# Patient Record
Sex: FEMALE | NOT HISPANIC OR LATINO
[De-identification: names, ages, dates, MRNs, and addresses within clinical notes are randomized per-mention and may not be internally consistent; named-entity substitution may affect disease eponyms.]

---

## 2020-07-02 ENCOUNTER — TRANSCRIPTION ENCOUNTER (OUTPATIENT)
Age: 70
End: 2020-07-02

## 2022-08-09 ENCOUNTER — APPOINTMENT (OUTPATIENT)
Dept: ORTHOPEDIC SURGERY | Facility: CLINIC | Age: 72
End: 2022-08-09

## 2022-08-09 VITALS — HEIGHT: 65 IN | WEIGHT: 180 LBS | BODY MASS INDEX: 29.99 KG/M2

## 2022-08-09 DIAGNOSIS — E78.00 PURE HYPERCHOLESTEROLEMIA, UNSPECIFIED: ICD-10-CM

## 2022-08-09 DIAGNOSIS — M65.9 SYNOVITIS AND TENOSYNOVITIS, UNSPECIFIED: ICD-10-CM

## 2022-08-09 DIAGNOSIS — M17.11 UNILATERAL PRIMARY OSTEOARTHRITIS, RIGHT KNEE: ICD-10-CM

## 2022-08-09 PROCEDURE — 99213 OFFICE O/P EST LOW 20 MIN: CPT

## 2022-08-09 NOTE — HISTORY OF PRESENT ILLNESS
[de-identified] : pt is here for a fu visit of the right knee. the pain in the right has been okay.\par the pain in the right  knee gets worse with activity.  pt feels discomfort in the right knee.   pt had a steroid injection in the right knee on 11/2/21 which helped a lot.

## 2022-08-09 NOTE — DISCUSSION/SUMMARY
[Medication Risks Reviewed] : Medication risks reviewed [Surgical risks reviewed] : Surgical risks reviewed [de-identified] : Since the patient is doing very well with the previous injection she is good with advil and ice for now. if the pain gets worse then she should follow up for another injection. \par \par

## 2023-02-14 ENCOUNTER — APPOINTMENT (OUTPATIENT)
Dept: ORTHOPEDIC SURGERY | Facility: CLINIC | Age: 73
End: 2023-02-14

## 2023-04-20 ENCOUNTER — APPOINTMENT (OUTPATIENT)
Dept: ORTHOPEDIC SURGERY | Facility: CLINIC | Age: 73
End: 2023-04-20
Payer: MEDICARE

## 2023-04-20 VITALS — WEIGHT: 180 LBS | BODY MASS INDEX: 29.99 KG/M2 | HEIGHT: 65 IN

## 2023-04-20 DIAGNOSIS — M79.673 PAIN IN UNSPECIFIED FOOT: ICD-10-CM

## 2023-04-20 DIAGNOSIS — Z78.9 OTHER SPECIFIED HEALTH STATUS: ICD-10-CM

## 2023-04-20 PROCEDURE — 99213 OFFICE O/P EST LOW 20 MIN: CPT

## 2023-04-21 NOTE — ASSESSMENT
[FreeTextEntry1] : Patient with occasional episodes of lateral foot pain, especially with walking barefoot. She has a normal lspine exam today and she cannot elicit the pain today that she gets. I believe the pain is likely generated from her foot and not her back. She will follow up with Dr. Hodge for this.

## 2023-04-21 NOTE — HISTORY OF PRESENT ILLNESS
[10] : 10 [Dull/Aching] : dull/aching [Radiating] : radiating [Constant] : constant [Walking] : walking [de-identified] : 4/20/23:72 y/o F with longstanding hx of occasional lumbar spine pain. She states she has occasional sharp pain on the outside of the foot that sometimes gives way when she walks. Pain is not constant, pain is worse when she walks barefoot. This has been going on for a few months.  [] : no [FreeTextEntry5] :  CIRO MIRANDA  is a 73 year old female presenting with  low back. Feeling pain in left foot.Feeling unstable when walking.  Had EMG completed; MRI L Spine completed. No images present.  [FreeTextEntry7] : left foot

## 2023-05-11 ENCOUNTER — APPOINTMENT (OUTPATIENT)
Dept: ORTHOPEDIC SURGERY | Facility: CLINIC | Age: 73
End: 2023-05-11
Payer: MEDICARE

## 2023-05-11 VITALS — HEIGHT: 65 IN | WEIGHT: 180 LBS | BODY MASS INDEX: 29.99 KG/M2

## 2023-05-11 DIAGNOSIS — Z00.00 ENCOUNTER FOR GENERAL ADULT MEDICAL EXAMINATION W/OUT ABNORMAL FINDINGS: ICD-10-CM

## 2023-05-11 DIAGNOSIS — G57.92 UNSPECIFIED MONONEUROPATHY OF LEFT LOWER LIMB: ICD-10-CM

## 2023-05-11 DIAGNOSIS — Z78.9 OTHER SPECIFIED HEALTH STATUS: ICD-10-CM

## 2023-05-11 PROCEDURE — 73630 X-RAY EXAM OF FOOT: CPT | Mod: LT

## 2023-05-11 PROCEDURE — 99214 OFFICE O/P EST MOD 30 MIN: CPT

## 2023-05-11 RX ORDER — LIDOCAINE 5% 700 MG/1
5 PATCH TOPICAL
Qty: 30 | Refills: 2 | Status: ACTIVE | COMMUNITY
Start: 2023-05-11 | End: 1900-01-01

## 2023-05-11 NOTE — DISCUSSION/SUMMARY
[de-identified] : Will try lidoderm patches\par Bone density recommended\par Can consider pain management or local nerve injection, but she has declined \par f/u as needed

## 2023-05-11 NOTE — DATA REVIEWED
[EMG Nerve Conduction] : A EMG Nerve Conduction test was completed of the [Lower extremity] : lower extremity [Negative] : negative [MRI] : MRI [Lumbar Spine] : lumbar spine [I independently reviewed and interpreted images and report] : I independently reviewed and interpreted images and report [I reviewed the films/CD and agree] : I reviewed the films/CD and agree [FreeTextEntry1] : 10/27/22: L2-L3  Disc herniation with mild central spinal stenosis, L3-L4 broad based disc henration with moderate to severe central spinal stenosis; L4-L5 spondy with moderate to severe spinal stenosis

## 2023-05-11 NOTE — HISTORY OF PRESENT ILLNESS
[6] : 6 [2] : 2 [Dull/Aching] : dull/aching [Sharp] : sharp [Constant] : constant [de-identified] : 5/11/23: Patient is a 73 year old F who presents today for consultation of her left foot. There is a sharp pain on the lateral side of the foot since 2022 that is intermittent.  Worse when she walks barefoot, when she hits a certain spot, feels like the left leg might give out and collapse. Symptoms are unpredictable. It has also has been affecting the left ankle. Denies trauma/previous injury. There is numbness/tingling to the lateral foot. She saw the neurologist where an EMG was done and negative. MRI of the L-spine done and she has seen Dr Cerna for a consult and referred here.  WB in [place].  Hx osteoporosis, bone density in 2021 is normal.\par  [FreeTextEntry1] : Left foot

## 2023-05-11 NOTE — PHYSICAL EXAM
[Left] : left foot and ankle [4th] : 4th [NL (20)] : dorsiflexion 20 degrees [NL (40)] : plantar flexion 40 degrees [2+] : dorsalis pedis pulse: 2+ [] : no achilles tendon insertion tenderness

## 2023-05-22 ENCOUNTER — APPOINTMENT (OUTPATIENT)
Dept: ORTHOPEDIC SURGERY | Facility: CLINIC | Age: 73
End: 2023-05-22
Payer: MEDICARE

## 2023-05-22 VITALS — HEIGHT: 65 IN | WEIGHT: 180 LBS | BODY MASS INDEX: 29.99 KG/M2

## 2023-05-22 PROCEDURE — 72170 X-RAY EXAM OF PELVIS: CPT

## 2023-05-22 PROCEDURE — 99214 OFFICE O/P EST MOD 30 MIN: CPT

## 2023-05-22 NOTE — IMAGING
[de-identified] : tenderness and spasm in the L paraspinal; L sciatic nerve tenderness; L GT bursa tenderness\par full ROM with mild stiffness\par full strength in LE and sensation intact\par able to heel/toe walk

## 2023-05-22 NOTE — ASSESSMENT
[FreeTextEntry1] : 74 yo F with 1 week of left sided back and hip pain; XRs with slip at L4-5; GT bursa tenderness; going to start PT for both; patient declines injection; will start with PT and see how she does\par \par Patient seen by Chela De Los Santos PA-C under the supervision of Ryan Cerna MD

## 2023-05-22 NOTE — HISTORY OF PRESENT ILLNESS
[10] : 10 [Dull/Aching] : dull/aching [Radiating] : radiating [Constant] : constant [Walking] : walking [Sleep] : sleep [Nothing helps with pain getting better] : Nothing helps with pain getting better [de-identified] : new left sided back pain x 1 week no injury; no leg pain; no n/t; saw dr davila for the L lateral foot pain - told she had nerve damage and to try lidocaine patches; some tingling in the L buttock and L lateral hip pain roxy with laying on that left side; pain with getting up from seated position; reports hx of sciatica - did PT which helped. went to pmd - given abx bc UA had high white count -  no prior spine injections or spinal surgeries.  [] : no [FreeTextEntry5] : Pt here for low back pain that is left sided. Was using lidocaine patch when pain started a few days ago. Constant pain. Worse when standing from sitting.  [FreeTextEntry7] : left hip

## 2023-05-22 NOTE — PHYSICAL EXAM
[Disc space narrowing] : Disc space narrowing [Spondylolithesis] : Spondylolithesis [FreeTextEntry1] : spondylolisthesis L4-5; loss of disc height L5-S1

## 2023-07-10 ENCOUNTER — APPOINTMENT (OUTPATIENT)
Dept: ORTHOPEDIC SURGERY | Facility: CLINIC | Age: 73
End: 2023-07-10

## 2023-11-22 ENCOUNTER — APPOINTMENT (OUTPATIENT)
Dept: ORTHOPEDIC SURGERY | Facility: CLINIC | Age: 73
End: 2023-11-22
Payer: MEDICARE

## 2023-11-22 VITALS — WEIGHT: 180 LBS | HEIGHT: 65 IN | BODY MASS INDEX: 29.99 KG/M2

## 2023-11-22 DIAGNOSIS — M75.31 CALCIFIC TENDINITIS OF RIGHT SHOULDER: ICD-10-CM

## 2023-11-22 DIAGNOSIS — M75.51 BURSITIS OF RIGHT SHOULDER: ICD-10-CM

## 2023-11-22 PROCEDURE — 99214 OFFICE O/P EST MOD 30 MIN: CPT

## 2023-11-22 PROCEDURE — 73030 X-RAY EXAM OF SHOULDER: CPT | Mod: RT

## 2023-11-22 RX ORDER — MELOXICAM 15 MG/1
15 TABLET ORAL
Qty: 30 | Refills: 0 | Status: COMPLETED | COMMUNITY
Start: 2023-11-22 | End: 2023-12-22

## 2024-02-26 ENCOUNTER — APPOINTMENT (OUTPATIENT)
Dept: ORTHOPEDIC SURGERY | Facility: CLINIC | Age: 74
End: 2024-02-26
Payer: MEDICARE

## 2024-02-26 DIAGNOSIS — M43.10 SPONDYLOLISTHESIS, SITE UNSPECIFIED: ICD-10-CM

## 2024-02-26 DIAGNOSIS — M48.061 SPINAL STENOSIS, LUMBAR REGION WITHOUT NEUROGENIC CLAUDICATION: ICD-10-CM

## 2024-02-26 DIAGNOSIS — M70.62 TROCHANTERIC BURSITIS, LEFT HIP: ICD-10-CM

## 2024-02-26 PROCEDURE — 99213 OFFICE O/P EST LOW 20 MIN: CPT

## 2024-02-26 NOTE — HISTORY OF PRESENT ILLNESS
[10] : 10 [Dull/Aching] : dull/aching [Radiating] : radiating [Constant] : constant [Sleep] : sleep [Nothing helps with pain getting better] : Nothing helps with pain getting better [Walking] : walking [de-identified] : 2/26/24: f/u LBP. states she was doing well but had return of some left sided back and buttock pain. soreness in bilateral lateral thighs when laying on that side. Did PT last visit which resolved the symptoms. When pain returned two weeks ago did HEP, ice/heat and that resolved the symptoms. Sometimes has heaviness in the legs with prolonged standing. denies weakness. denies bb incontinence. denies meds. currently no pain or symptoms at this point.    new left sided back pain x 1 week no injury; no leg pain; no n/t; saw dr davila for the L lateral foot pain - told she had nerve damage and to try lidocaine patches; some tingling in the L buttock and L lateral hip pain roxy with laying on that left side; pain with getting up from seated position; reports hx of sciatica - did PT which helped. went to pmd - given abx bc UA had high white count -  no prior spine injections or spinal surgeries.  [] : no [FreeTextEntry5] : Pt here for low back pain that is left sided. Was using lidocaine patch when pain started a few days ago. Constant pain. Worse when standing from sitting.  [FreeTextEntry7] : left hip

## 2024-02-26 NOTE — ASSESSMENT
[FreeTextEntry1] : 74 yo F with recurrent left lumbar and buttock pain x 2 weeks, resolved with time and HEP. No longer has any pain or symptoms. No red flag symptoms or exam findings. XRs from last visit with spondylolisthesis at L4-5 and loss of disc height at L5-S1 that correlated with her flare up of pain a few weeks ago. We discussed role of MRI to eval stenosis but given symptoms have fully resolved will defer at this time. continued HEP encouraged. if symptoms return despite these measures will move forward with MRI. New HEP provided today.   Patient seen by Chela De Los Santos PA-C under the supervision of Ryan Cerna MD

## 2024-02-26 NOTE — IMAGING
[de-identified] : tenderness and spasm in the L paraspinal; no other tenderness full ROM with mild stiffness full strength in LE and sensation intact able to heel/toe walk ambulating without assistive device.

## 2024-11-30 ENCOUNTER — APPOINTMENT (OUTPATIENT)
Dept: ORTHOPEDIC SURGERY | Facility: CLINIC | Age: 74
End: 2024-11-30

## 2024-11-30 VITALS — BODY MASS INDEX: 30.49 KG/M2 | WEIGHT: 183 LBS | HEIGHT: 65 IN

## 2024-11-30 PROCEDURE — 73130 X-RAY EXAM OF HAND: CPT | Mod: RT

## 2024-11-30 PROCEDURE — L3908: CPT | Mod: RT

## 2024-11-30 PROCEDURE — 99213 OFFICE O/P EST LOW 20 MIN: CPT | Mod: 25

## 2024-11-30 RX ORDER — METHYLPREDNISOLONE 4 MG/1
4 TABLET ORAL
Qty: 1 | Refills: 1 | Status: ACTIVE | COMMUNITY
Start: 2024-11-30 | End: 1900-01-01

## 2024-11-30 RX ORDER — ROSUVASTATIN CALCIUM 5 MG/1
5 TABLET, FILM COATED ORAL
Refills: 0 | Status: ACTIVE | COMMUNITY

## 2024-12-04 ENCOUNTER — APPOINTMENT (OUTPATIENT)
Dept: ORTHOPEDIC SURGERY | Facility: CLINIC | Age: 74
End: 2024-12-04
Payer: MEDICARE

## 2024-12-04 VITALS — BODY MASS INDEX: 30.49 KG/M2 | WEIGHT: 183 LBS | HEIGHT: 65 IN

## 2024-12-04 DIAGNOSIS — M77.8 OTHER ENTHESOPATHIES, NOT ELSEWHERE CLASSIFIED: ICD-10-CM

## 2024-12-04 PROCEDURE — 99213 OFFICE O/P EST LOW 20 MIN: CPT

## 2024-12-04 PROCEDURE — 99203 OFFICE O/P NEW LOW 30 MIN: CPT

## 2025-01-24 ENCOUNTER — APPOINTMENT (OUTPATIENT)
Dept: ORTHOPEDIC SURGERY | Facility: CLINIC | Age: 75
End: 2025-01-24

## 2025-01-24 DIAGNOSIS — M72.2 PLANTAR FASCIAL FIBROMATOSIS: ICD-10-CM

## 2025-01-24 PROCEDURE — L4397: CPT | Mod: KX,RT

## 2025-01-24 PROCEDURE — 73630 X-RAY EXAM OF FOOT: CPT | Mod: RT

## 2025-01-24 RX ORDER — DICLOFENAC SODIUM 75 MG/1
75 TABLET, DELAYED RELEASE ORAL TWICE DAILY
Qty: 60 | Refills: 3 | Status: ACTIVE | COMMUNITY
Start: 2025-01-24 | End: 2025-05-24

## 2025-02-05 ENCOUNTER — APPOINTMENT (OUTPATIENT)
Dept: ORTHOPEDIC SURGERY | Facility: CLINIC | Age: 75
End: 2025-02-05
Payer: MEDICARE

## 2025-02-05 DIAGNOSIS — M72.2 PLANTAR FASCIAL FIBROMATOSIS: ICD-10-CM

## 2025-02-05 PROCEDURE — 99214 OFFICE O/P EST MOD 30 MIN: CPT

## 2025-02-05 PROCEDURE — L1902: CPT | Mod: KX,RT

## 2025-02-07 ENCOUNTER — APPOINTMENT (OUTPATIENT)
Dept: ORTHOPEDIC SURGERY | Facility: CLINIC | Age: 75
End: 2025-02-07

## 2025-03-05 ENCOUNTER — APPOINTMENT (OUTPATIENT)
Dept: ORTHOPEDIC SURGERY | Facility: CLINIC | Age: 75
End: 2025-03-05
Payer: MEDICARE

## 2025-03-05 DIAGNOSIS — M72.2 PLANTAR FASCIAL FIBROMATOSIS: ICD-10-CM

## 2025-03-05 PROCEDURE — 99213 OFFICE O/P EST LOW 20 MIN: CPT

## 2025-04-16 ENCOUNTER — APPOINTMENT (OUTPATIENT)
Dept: ORTHOPEDIC SURGERY | Facility: CLINIC | Age: 75
End: 2025-04-16
Payer: MEDICARE

## 2025-04-16 DIAGNOSIS — M72.2 PLANTAR FASCIAL FIBROMATOSIS: ICD-10-CM

## 2025-04-16 PROCEDURE — 99213 OFFICE O/P EST LOW 20 MIN: CPT
